# Patient Record
Sex: MALE | Race: WHITE
[De-identification: names, ages, dates, MRNs, and addresses within clinical notes are randomized per-mention and may not be internally consistent; named-entity substitution may affect disease eponyms.]

---

## 2017-09-06 ENCOUNTER — HOSPITAL ENCOUNTER (INPATIENT)
Dept: HOSPITAL 8 - ED | Age: 62
LOS: 1 days | Discharge: HOME | DRG: 897 | End: 2017-09-07
Attending: FAMILY MEDICINE | Admitting: FAMILY MEDICINE
Payer: COMMERCIAL

## 2017-09-06 VITALS — WEIGHT: 192.9 LBS | BODY MASS INDEX: 27.01 KG/M2 | HEIGHT: 71 IN

## 2017-09-06 DIAGNOSIS — R09.02: ICD-10-CM

## 2017-09-06 DIAGNOSIS — H93.11: ICD-10-CM

## 2017-09-06 DIAGNOSIS — Z81.8: ICD-10-CM

## 2017-09-06 DIAGNOSIS — I10: ICD-10-CM

## 2017-09-06 DIAGNOSIS — F12.929: Primary | ICD-10-CM

## 2017-09-06 DIAGNOSIS — G25.0: ICD-10-CM

## 2017-09-06 DIAGNOSIS — Z82.49: ICD-10-CM

## 2017-09-06 DIAGNOSIS — Z84.89: ICD-10-CM

## 2017-09-06 DIAGNOSIS — Z79.899: ICD-10-CM

## 2017-09-06 DIAGNOSIS — Z91.013: ICD-10-CM

## 2017-09-06 DIAGNOSIS — E86.0: ICD-10-CM

## 2017-09-06 DIAGNOSIS — Z90.81: ICD-10-CM

## 2017-09-06 DIAGNOSIS — J02.9: ICD-10-CM

## 2017-09-06 LAB
AST SERPL-CCNC: 21 U/L (ref 15–37)
BUN SERPL-MCNC: 11 MG/DL (ref 7–18)
HCT VFR BLD CALC: 44.9 % (ref 39.2–51.8)
HGB BLD-MCNC: 15.1 G/DL (ref 13.7–18)
WBC # BLD AUTO: 8.1 X10^3/UL (ref 3.4–10)

## 2017-09-06 PROCEDURE — 74022 RADEX COMPL AQT ABD SERIES: CPT

## 2017-09-06 PROCEDURE — 70450 CT HEAD/BRAIN W/O DYE: CPT

## 2017-09-06 PROCEDURE — 36415 COLL VENOUS BLD VENIPUNCTURE: CPT

## 2017-09-06 PROCEDURE — 83690 ASSAY OF LIPASE: CPT

## 2017-09-06 PROCEDURE — 96360 HYDRATION IV INFUSION INIT: CPT

## 2017-09-06 PROCEDURE — 96372 THER/PROPH/DIAG INJ SC/IM: CPT

## 2017-09-06 PROCEDURE — 81003 URINALYSIS AUTO W/O SCOPE: CPT

## 2017-09-06 PROCEDURE — 80053 COMPREHEN METABOLIC PANEL: CPT

## 2017-09-06 PROCEDURE — 93005 ELECTROCARDIOGRAM TRACING: CPT

## 2017-09-06 PROCEDURE — 85025 COMPLETE CBC W/AUTO DIFF WBC: CPT

## 2017-09-07 VITALS — DIASTOLIC BLOOD PRESSURE: 63 MMHG | SYSTOLIC BLOOD PRESSURE: 104 MMHG

## 2017-09-07 VITALS — DIASTOLIC BLOOD PRESSURE: 62 MMHG | SYSTOLIC BLOOD PRESSURE: 107 MMHG

## 2017-09-07 VITALS — DIASTOLIC BLOOD PRESSURE: 57 MMHG | SYSTOLIC BLOOD PRESSURE: 107 MMHG

## 2017-09-07 RX ADMIN — SODIUM CHLORIDE SCH MLS/HR: 0.9 INJECTION, SOLUTION INTRAVENOUS at 08:30

## 2017-09-07 RX ADMIN — SODIUM CHLORIDE SCH MLS/HR: 0.9 INJECTION, SOLUTION INTRAVENOUS at 02:57

## 2020-12-14 ENCOUNTER — HOSPITAL ENCOUNTER (EMERGENCY)
Dept: HOSPITAL 8 - ED | Age: 65
Discharge: HOME | End: 2020-12-14
Payer: COMMERCIAL

## 2020-12-14 VITALS — DIASTOLIC BLOOD PRESSURE: 73 MMHG | SYSTOLIC BLOOD PRESSURE: 140 MMHG

## 2020-12-14 VITALS — WEIGHT: 185.85 LBS | BODY MASS INDEX: 26.02 KG/M2 | HEIGHT: 71 IN

## 2020-12-14 DIAGNOSIS — I10: ICD-10-CM

## 2020-12-14 DIAGNOSIS — K59.00: Primary | ICD-10-CM

## 2020-12-14 DIAGNOSIS — R10.84: ICD-10-CM

## 2020-12-14 PROCEDURE — 74018 RADEX ABDOMEN 1 VIEW: CPT

## 2020-12-14 PROCEDURE — 99284 EMERGENCY DEPT VISIT MOD MDM: CPT

## 2020-12-14 PROCEDURE — 99283 EMERGENCY DEPT VISIT LOW MDM: CPT

## 2023-04-23 NOTE — NUR
PT C/O CHRONIC CONSTIPATION OVER PAST 14MONTHS, HAS BEEN USING OTC STIMULANTS 
AND SOFTENERS BUT FEELS THEY ARE NO LONGER EFFECTIVE.  PT RPTS NO NORMAL BM FOR 
OVER A WEEK.  C/O DIFFUSE ABD PAIN BUT AT THIS TIME DENIES ANY PAIN.  VSS, CALL 
LIGHT W/I REACH.  ER PROVIDER EVAL PENDING. General